# Patient Record
Sex: FEMALE | Race: WHITE | URBAN - METROPOLITAN AREA
[De-identification: names, ages, dates, MRNs, and addresses within clinical notes are randomized per-mention and may not be internally consistent; named-entity substitution may affect disease eponyms.]

---

## 2020-11-13 ENCOUNTER — OFFICE VISIT (OUTPATIENT)
Dept: URBAN - METROPOLITAN AREA CLINIC 13 | Facility: CLINIC | Age: 55
End: 2020-11-13
Payer: COMMERCIAL

## 2020-11-13 DIAGNOSIS — H43.13 VITREOUS HEMORRHAGE, BILATERAL: ICD-10-CM

## 2020-11-13 DIAGNOSIS — Z96.1 PRESENCE OF PSEUDOPHAKIA: ICD-10-CM

## 2020-11-13 DIAGNOSIS — E11.3513 TYPE 2 DIABETES MELLITUS WITH PROLIFERATIVE DIABETIC RETINOPATHY WITH MACULAR EDEMA, BILATERAL: Primary | ICD-10-CM

## 2020-11-13 PROCEDURE — 99213 OFFICE O/P EST LOW 20 MIN: CPT | Performed by: OPHTHALMOLOGY

## 2020-11-13 PROCEDURE — 92134 CPTRZ OPH DX IMG PST SGM RTA: CPT | Performed by: OPHTHALMOLOGY

## 2020-11-13 ASSESSMENT — INTRAOCULAR PRESSURE
OD: 18
OS: 18

## 2020-11-13 NOTE — IMPRESSION/PLAN
Impression: Vitreous hemorrhage, bilateral: H43.13 OU.
s/p PPV/PRP OS 7/12/17 Plan: S/P Ozurdex OU.   Monitor

## 2020-11-13 NOTE — IMPRESSION/PLAN
Impression: Type 2 diab with prolif diab rtnop with macular edema, bi: [id number]. OU.
s/p Ozurdex OU last 08/16/19
s/p Avastin OU last 11/26/19 S/P PPV/PRP OS last 7/12/17
s/p PRP OD 7/11/17 Plan: Exam and OCT confirm resolved VH OU,  PDR with improving DME OU, s/p Ozurdex OU 9/4/2020. IOP stable. Discussed findings and options. Recommend observation OU today. BS/BP control. 

RTC 6-8 weeks OCT OU reeval Ozurdex

## 2021-01-19 ENCOUNTER — OFFICE VISIT (OUTPATIENT)
Dept: URBAN - METROPOLITAN AREA CLINIC 13 | Facility: CLINIC | Age: 56
End: 2021-01-19
Payer: COMMERCIAL

## 2021-01-19 PROCEDURE — 99213 OFFICE O/P EST LOW 20 MIN: CPT | Performed by: OPHTHALMOLOGY

## 2021-01-19 PROCEDURE — 92134 CPTRZ OPH DX IMG PST SGM RTA: CPT | Performed by: OPHTHALMOLOGY

## 2021-01-19 ASSESSMENT — INTRAOCULAR PRESSURE
OS: 14
OD: 14

## 2021-01-19 NOTE — IMPRESSION/PLAN
Impression: Type 2 diab with prolif diab rtnop with macular edema, bi: [id number]. OU.
s/p Ozurdex OU last 08/16/19
s/p Avastin OU last 11/26/19 S/P PPV/PRP OS last 7/12/17
s/p PRP OD 7/11/17 Plan: Exam and OCT confirm resolved VH OU,  PDR with minimal DME OU, s/p Ozurdex OU 9/4/2020. IOP stable. Discussed findings and options. Recommend observation OU today. BS/BP control. 

RTC 6 months OCT OU reeval Ozurdex

## 2021-07-20 ENCOUNTER — OFFICE VISIT (OUTPATIENT)
Dept: URBAN - METROPOLITAN AREA CLINIC 13 | Facility: CLINIC | Age: 56
End: 2021-07-20
Payer: COMMERCIAL

## 2021-07-20 PROCEDURE — 92014 COMPRE OPH EXAM EST PT 1/>: CPT | Performed by: OPHTHALMOLOGY

## 2021-07-20 PROCEDURE — 92134 CPTRZ OPH DX IMG PST SGM RTA: CPT | Performed by: OPHTHALMOLOGY

## 2021-07-20 ASSESSMENT — INTRAOCULAR PRESSURE
OS: 15
OD: 18

## 2021-07-20 NOTE — IMPRESSION/PLAN
Impression: Type 2 diab with prolif diab rtnop with macular edema, bi: [id number]. OU.
s/p Ozurdex OU last 09/4/2020
s/p Avastin OU last 11/26/19 S/P PPV/PRP OS last 7/12/17
s/p PRP OD 7/11/17 Plan: Exam and OCT confirm resolved VH OU,  PDR with worsening, diffuse DME OD>OS, s/p Ozurdex OU 9/4/2020. IOP stable. Discussed findings and options. Recommend Ozurdex OU. Pt elects to proceed on Friday RTC 3 days for Ozurdex OU (straight)

## 2021-07-20 NOTE — IMPRESSION/PLAN
Impression: Presence of pseudophakia: Z96.1. Bilateral. Plan: Doing well s/p CE/IOL OU. Mild PCO OD>OS.   Follow

## 2021-07-23 ENCOUNTER — PROCEDURE (OUTPATIENT)
Dept: URBAN - METROPOLITAN AREA CLINIC 13 | Facility: CLINIC | Age: 56
End: 2021-07-23
Payer: COMMERCIAL

## 2021-07-23 ASSESSMENT — INTRAOCULAR PRESSURE
OD: 12
OS: 14

## 2021-08-31 ENCOUNTER — OFFICE VISIT (OUTPATIENT)
Dept: URBAN - METROPOLITAN AREA CLINIC 13 | Facility: CLINIC | Age: 56
End: 2021-08-31
Payer: COMMERCIAL

## 2021-08-31 PROCEDURE — 99213 OFFICE O/P EST LOW 20 MIN: CPT | Performed by: OPHTHALMOLOGY

## 2021-08-31 PROCEDURE — 92134 CPTRZ OPH DX IMG PST SGM RTA: CPT | Performed by: OPHTHALMOLOGY

## 2021-08-31 ASSESSMENT — INTRAOCULAR PRESSURE
OS: 16
OD: 14

## 2021-08-31 NOTE — IMPRESSION/PLAN
Impression: Type 2 diab with prolif diab rtnop with macular edema, bi: [id number]. OU.
s/p Ozurdex OU last 7/23/21
s/p Avastin OU last 11/26/19 S/P PPV/PRP OS last 7/12/17
s/p PRP OD 7/11/17 Plan: Exam and OCT confirm resolved VH OU,  PDR with improving DME OU, s/p Ozurdex OU 7/23/21. IOP stable. Discussed findings and options. Recommend observation OU today. Pt understands to call with s/s dec [facility]/pain/floaters prior to next scheduled visit. 

RTC 4 months for OCT OU re-eval for Ozurdex

## 2021-12-03 ENCOUNTER — OFFICE VISIT (OUTPATIENT)
Dept: URBAN - METROPOLITAN AREA CLINIC 7 | Facility: CLINIC | Age: 56
End: 2021-12-03
Payer: COMMERCIAL

## 2021-12-03 PROCEDURE — 92134 CPTRZ OPH DX IMG PST SGM RTA: CPT | Performed by: OPHTHALMOLOGY

## 2021-12-03 PROCEDURE — 99214 OFFICE O/P EST MOD 30 MIN: CPT | Performed by: OPHTHALMOLOGY

## 2021-12-03 ASSESSMENT — INTRAOCULAR PRESSURE
OS: 14
OD: 13

## 2021-12-03 NOTE — IMPRESSION/PLAN
Impression: Type 2 diab with prolif diab rtnop with macular edema, bi: [id number]. OU.
s/p Ozurdex OU last 7/23/21
s/p Avastin OU last 11/26/19 S/P PPV/PRP OS last 7/12/17
s/p PRP OD 7/11/17 Plan: Active PDR with VH OD. OCT shows recurrent DME OS, no view OD, s/p Ozurdex OU 7/23/21. IOP stable. Discussed findings and options. Recommend surgery OD in 1-2 weeks. Recommend Ozurdex OS today - pt elects to proceed. Pt understands to call with s/s dec [facility]/pain/floaters prior to next scheduled visit.

## 2021-12-03 NOTE — IMPRESSION/PLAN
Impression: Vitreous hemorrhage, bilateral: H43.13 OU.
s/p PPV/PRP OS 7/12/17 Plan: --dense, recurrent VH OD despite adequate time for resolution
--resolved VH OS s/p PPV/PRP OS
--findings/diagnosis d/w patient in detail
--options discussed, inc observation, anti-VEGF, and PPV
--r/b/a PPV/EL discussed, inc bleeding, infection, pain, loss of vision --pt elects to proceed with surgery at this time --pt elects pre-op Lucentis 0.3 (sample) today to reduce risk of intra-op hemorrhage SURGICAL PLAN: 25G PPV/MP/EL OD

## 2021-12-15 ENCOUNTER — SURGERY (OUTPATIENT)
Dept: URBAN - METROPOLITAN AREA EXTERNAL CLINIC 26 | Facility: EXTERNAL CLINIC | Age: 56
End: 2021-12-15
Payer: COMMERCIAL

## 2021-12-15 PROCEDURE — 67040 LASER TREATMENT OF RETINA: CPT | Performed by: OPHTHALMOLOGY

## 2021-12-16 ENCOUNTER — POST-OPERATIVE VISIT (OUTPATIENT)
Dept: URBAN - METROPOLITAN AREA CLINIC 27 | Facility: CLINIC | Age: 56
End: 2021-12-16

## 2021-12-16 PROCEDURE — 99024 POSTOP FOLLOW-UP VISIT: CPT | Performed by: OPHTHALMOLOGY

## 2021-12-16 ASSESSMENT — INTRAOCULAR PRESSURE
OS: 10
OD: 10

## 2021-12-16 NOTE — IMPRESSION/PLAN
Impression: S/P 25G PPV/MP/EL OD - 1 Day. Vitreous hemorrhage, bilateral  H43.13.  Plan: --retina attached
--no s/s infection
--IOP acceptable
--start PF/Oflox QID
--RD/endoph WS discussed
--f/u 1 week

## 2022-02-01 ENCOUNTER — POST-OPERATIVE VISIT (OUTPATIENT)
Dept: URBAN - METROPOLITAN AREA CLINIC 13 | Facility: CLINIC | Age: 57
End: 2022-02-01
Payer: COMMERCIAL

## 2022-02-01 PROCEDURE — 99024 POSTOP FOLLOW-UP VISIT: CPT | Performed by: OPHTHALMOLOGY

## 2022-02-01 PROCEDURE — 92134 CPTRZ OPH DX IMG PST SGM RTA: CPT | Performed by: OPHTHALMOLOGY

## 2022-02-01 ASSESSMENT — INTRAOCULAR PRESSURE
OS: 16
OD: 14

## 2022-02-01 NOTE — IMPRESSION/PLAN
Impression: s/p 25g PPV/MP/EL OD - 42 Days. Vitreous hemorrhage, bilateral  H43.13.
s/p Ozurdex, last 12/21/21 Plan: --retina attached
--no s/s infection
--IOP acceptable
--OCT: resolved DME s/p OZ
--rec observation today
--RD/endoph WS discussed RTC 6 wks DFE/OCT OU re-eval Ozurdex

## 2022-03-29 ENCOUNTER — OFFICE VISIT (OUTPATIENT)
Dept: URBAN - METROPOLITAN AREA CLINIC 13 | Facility: CLINIC | Age: 57
End: 2022-03-29
Payer: COMMERCIAL

## 2022-03-29 PROCEDURE — 92134 CPTRZ OPH DX IMG PST SGM RTA: CPT | Performed by: OPHTHALMOLOGY

## 2022-03-29 PROCEDURE — 99213 OFFICE O/P EST LOW 20 MIN: CPT | Performed by: OPHTHALMOLOGY

## 2022-03-29 ASSESSMENT — INTRAOCULAR PRESSURE
OS: 16
OD: 17

## 2022-03-29 NOTE — IMPRESSION/PLAN
Impression: Type 2 diab with prolif diab rtnop with macular edema, bi: [id number]. OU.
s/p Ozurdex OU, last OD 12/21/21, last OS 12/3/21 
s/p Avastin OU last 11/26/19 S/P PPV/PRP OS last 7/12/17
s/p PRP OD 7/11/17 Plan: Stable PDR with resolved VH OD. OCT shows mild DME OU. IOP stable. Discussed findings and options. Recommend observation. Pt understands to call with s/s dec [facility]/pain/floaters prior to next scheduled visit. 

RTC 3 months for DFE/OCT OU re-eval for Ozurdex

## 2022-03-29 NOTE — IMPRESSION/PLAN
Impression: Vitreous hemorrhage, bilateral  H43.13.
s/p 25g PPV/MP/EL OD 12/15/21
s/p 25g PPV/MP/EL OS 2017 Plan: VH remains resolved OU on exam.  Observe.   BS/BP control discussed

## 2022-06-07 ENCOUNTER — OFFICE VISIT (OUTPATIENT)
Dept: URBAN - METROPOLITAN AREA CLINIC 13 | Facility: CLINIC | Age: 57
End: 2022-06-07
Payer: COMMERCIAL

## 2022-06-07 DIAGNOSIS — E11.3513 TYPE 2 DIABETES MELLITUS WITH PROLIFERATIVE DIABETIC RETINOPATHY WITH MACULAR EDEMA, BILATERAL: Primary | ICD-10-CM

## 2022-06-07 DIAGNOSIS — Z96.1 PRESENCE OF PSEUDOPHAKIA: ICD-10-CM

## 2022-06-07 DIAGNOSIS — H43.13 VITREOUS HEMORRHAGE, BILATERAL: ICD-10-CM

## 2022-06-07 PROCEDURE — 99214 OFFICE O/P EST MOD 30 MIN: CPT | Performed by: OPHTHALMOLOGY

## 2022-06-07 PROCEDURE — 92134 CPTRZ OPH DX IMG PST SGM RTA: CPT | Performed by: OPHTHALMOLOGY

## 2022-06-07 ASSESSMENT — INTRAOCULAR PRESSURE
OD: 18
OS: 15

## 2022-06-07 NOTE — IMPRESSION/PLAN
Impression: Type 2 diab with prolif diab rtnop with macular edema, bi: [id number]. OU.
s/p Ozurdex OU, last OD 12/21/21, last OS 12/3/21 
s/p Avastin OU last 11/26/19 S/P PPV/PRP OS last 7/12/17
s/p PRP OD 7/11/17 Plan: Stable PDR with resolved VH OD. OCT shows worsening DME OU. IOP stable. Discussed findings and options. Recommend Ozurdex OU. Pt elects to proceed with Ozurdex OU today. Pt understands to call with s/s dec VA/pain/floaters prior to next scheduled visit. 

RTC 8 weeks for DFE/OCT OU

## 2022-08-02 ENCOUNTER — OFFICE VISIT (OUTPATIENT)
Dept: URBAN - METROPOLITAN AREA CLINIC 13 | Facility: CLINIC | Age: 57
End: 2022-08-02
Payer: COMMERCIAL

## 2022-08-02 DIAGNOSIS — H43.13 VITREOUS HEMORRHAGE, BILATERAL: ICD-10-CM

## 2022-08-02 DIAGNOSIS — E11.3513 TYPE 2 DIABETES MELLITUS WITH PROLIFERATIVE DIABETIC RETINOPATHY WITH MACULAR EDEMA, BILATERAL: Primary | ICD-10-CM

## 2022-08-02 DIAGNOSIS — Z96.1 PRESENCE OF PSEUDOPHAKIA: ICD-10-CM

## 2022-08-02 PROCEDURE — 99213 OFFICE O/P EST LOW 20 MIN: CPT | Performed by: OPHTHALMOLOGY

## 2022-08-02 PROCEDURE — 92134 CPTRZ OPH DX IMG PST SGM RTA: CPT | Performed by: OPHTHALMOLOGY

## 2022-08-02 ASSESSMENT — INTRAOCULAR PRESSURE
OS: 12
OD: 12

## 2022-08-02 NOTE — IMPRESSION/PLAN
Impression: Type 2 diab with prolif diab rtnop with macular edema, bi: [id number]. OU.
s/p Ozurdex OU, last OD 06/07/22, last OS 06/07/22 
s/p Avastin OU last 11/26/19 S/P PPV/PRP OS last 7/12/17
s/p PRP OD 7/11/17 Plan: Stable PDR with resolved VH OU. OCT shows improving DME OU s/p Ozurdex   IOP stable. Discussed findings and options. Recommend observation OU. Pt understands to call with s/s dec [facility]/pain/floaters prior to next scheduled visit. Will consider Iluvien if DME recurs in future given multiple exacerbations with Ozurdex. 

RTC 4 months for DFE/OCT OU re-eval for Ozurdex vs Iluvien

## 2022-12-06 ENCOUNTER — OFFICE VISIT (OUTPATIENT)
Dept: URBAN - METROPOLITAN AREA CLINIC 13 | Facility: CLINIC | Age: 57
End: 2022-12-06
Payer: COMMERCIAL

## 2022-12-06 DIAGNOSIS — H43.13 VITREOUS HEMORRHAGE, BILATERAL: ICD-10-CM

## 2022-12-06 DIAGNOSIS — Z96.1 PRESENCE OF PSEUDOPHAKIA: ICD-10-CM

## 2022-12-06 DIAGNOSIS — E11.3513 TYPE 2 DIABETES MELLITUS WITH PROLIFERATIVE DIABETIC RETINOPATHY WITH MACULAR EDEMA, BILATERAL: Primary | ICD-10-CM

## 2022-12-06 PROCEDURE — 92134 CPTRZ OPH DX IMG PST SGM RTA: CPT | Performed by: OPHTHALMOLOGY

## 2022-12-06 PROCEDURE — 67028 INJECTION EYE DRUG: CPT | Performed by: OPHTHALMOLOGY

## 2022-12-06 PROCEDURE — 99213 OFFICE O/P EST LOW 20 MIN: CPT | Performed by: OPHTHALMOLOGY

## 2022-12-06 ASSESSMENT — INTRAOCULAR PRESSURE
OS: 16
OD: 17

## 2022-12-06 NOTE — IMPRESSION/PLAN
Impression: Type 2 diab with prolif diab rtnop with macular edema, bi: [id number]. OU.
s/p Ozurdex OU, last OD 06/07/22, last OS 06/07/22 
s/p Avastin OU last 11/26/19 S/P PPV/PRP OS last 7/12/17
s/p PRP OD 7/11/17 Plan: Stable PDR with resolved VH OU. OCT shows worsening DME OD>OS. IOP stable. Discussed findings and options. Recommend Iluvien OD. Discussed RBA of Iluvien x PDR/DME. Pt elects to proceed with Iluvien today - no complications encountered. Pt understands to call with s/s dec [facility]/pain/floaters prior to next scheduled visit.   

RTC 3 months for DFE/OCT OU re-eval for [facility]

## 2023-07-21 ENCOUNTER — OFFICE VISIT (OUTPATIENT)
Dept: URBAN - METROPOLITAN AREA CLINIC 13 | Facility: CLINIC | Age: 58
End: 2023-07-21
Payer: COMMERCIAL

## 2023-07-21 DIAGNOSIS — E11.3513 TYPE 2 DIABETES MELLITUS WITH PROLIFERATIVE DIABETIC RETINOPATHY WITH MACULAR EDEMA, BILATERAL: Primary | ICD-10-CM

## 2023-07-21 DIAGNOSIS — Z96.1 PRESENCE OF PSEUDOPHAKIA: ICD-10-CM

## 2023-07-21 DIAGNOSIS — H43.13 VITREOUS HEMORRHAGE, BILATERAL: ICD-10-CM

## 2023-07-21 PROCEDURE — 92134 CPTRZ OPH DX IMG PST SGM RTA: CPT | Performed by: OPHTHALMOLOGY

## 2023-07-21 PROCEDURE — 99213 OFFICE O/P EST LOW 20 MIN: CPT | Performed by: OPHTHALMOLOGY

## 2023-07-21 ASSESSMENT — INTRAOCULAR PRESSURE
OD: 14
OS: 16

## 2023-07-21 NOTE — IMPRESSION/PLAN
Impression: Type 2 diab with prolif diab rtnop with macular edema, bi: [id number]. OU.
s/p Ozurdex OU, last OD 06/07/22, last OS 06/07/22 
s/p Avastin OU last 11/26/19 S/P PPV/PRP OS last 7/12/17
s/p PRP OD 7/11/17
s/p Iluvien OD x 1, last 12/06/22, OS x 1 03/14/23 Plan: Stable PDR with resolved VH OU. Exam and OCT show minimal edema OU. IOP stable OU. Discussed findings and options. Recommend observation. Pt understands to call with s/s dec VA/pain/floaters prior to next scheduled visit.   

RTC 6 months for OCT OU re-eval for Ozurdex

## 2024-01-05 ENCOUNTER — OFFICE VISIT (OUTPATIENT)
Dept: URBAN - METROPOLITAN AREA CLINIC 13 | Facility: CLINIC | Age: 59
End: 2024-01-05
Payer: COMMERCIAL

## 2024-01-05 DIAGNOSIS — Z96.1 PRESENCE OF PSEUDOPHAKIA: ICD-10-CM

## 2024-01-05 DIAGNOSIS — E11.3513 TYPE 2 DIABETES MELLITUS WITH PROLIFERATIVE DIABETIC RETINOPATHY WITH MACULAR EDEMA, BILATERAL: Primary | ICD-10-CM

## 2024-01-05 DIAGNOSIS — H43.13 VITREOUS HEMORRHAGE, BILATERAL: ICD-10-CM

## 2024-01-05 PROCEDURE — 92014 COMPRE OPH EXAM EST PT 1/>: CPT | Performed by: OPHTHALMOLOGY

## 2024-01-05 PROCEDURE — 92134 CPTRZ OPH DX IMG PST SGM RTA: CPT | Performed by: OPHTHALMOLOGY

## 2024-01-05 ASSESSMENT — INTRAOCULAR PRESSURE
OD: 18
OS: 18

## 2024-06-10 ENCOUNTER — OFFICE VISIT (OUTPATIENT)
Dept: URBAN - METROPOLITAN AREA CLINIC 7 | Facility: CLINIC | Age: 59
End: 2024-06-10
Payer: COMMERCIAL

## 2024-06-10 DIAGNOSIS — Z96.1 PRESENCE OF PSEUDOPHAKIA: ICD-10-CM

## 2024-06-10 DIAGNOSIS — E11.3513 TYPE 2 DIABETES MELLITUS WITH PROLIFERATIVE DIABETIC RETINOPATHY WITH MACULAR EDEMA, BILATERAL: Primary | ICD-10-CM

## 2024-06-10 DIAGNOSIS — H43.13 VITREOUS HEMORRHAGE, BILATERAL: ICD-10-CM

## 2024-06-10 PROCEDURE — 92014 COMPRE OPH EXAM EST PT 1/>: CPT | Performed by: OPHTHALMOLOGY

## 2024-06-10 PROCEDURE — 92134 CPTRZ OPH DX IMG PST SGM RTA: CPT | Performed by: OPHTHALMOLOGY

## 2024-06-10 ASSESSMENT — INTRAOCULAR PRESSURE
OD: 18
OS: 17

## 2024-12-06 ENCOUNTER — OFFICE VISIT (OUTPATIENT)
Dept: URBAN - METROPOLITAN AREA CLINIC 13 | Facility: CLINIC | Age: 59
End: 2024-12-06
Payer: COMMERCIAL

## 2024-12-06 DIAGNOSIS — Z96.1 PRESENCE OF PSEUDOPHAKIA: ICD-10-CM

## 2024-12-06 DIAGNOSIS — H43.13 VITREOUS HEMORRHAGE, BILATERAL: ICD-10-CM

## 2024-12-06 DIAGNOSIS — E11.3513 TYPE 2 DIABETES MELLITUS WITH PROLIFERATIVE DIABETIC RETINOPATHY WITH MACULAR EDEMA, BILATERAL: Primary | ICD-10-CM

## 2024-12-06 PROCEDURE — 92134 CPTRZ OPH DX IMG PST SGM RTA: CPT | Performed by: OPHTHALMOLOGY

## 2024-12-06 PROCEDURE — 92014 COMPRE OPH EXAM EST PT 1/>: CPT | Performed by: OPHTHALMOLOGY

## 2024-12-06 ASSESSMENT — INTRAOCULAR PRESSURE
OD: 13
OS: 13

## 2025-06-06 ENCOUNTER — OFFICE VISIT (OUTPATIENT)
Dept: URBAN - METROPOLITAN AREA CLINIC 13 | Facility: CLINIC | Age: 60
End: 2025-06-06
Payer: COMMERCIAL

## 2025-06-06 DIAGNOSIS — Z96.1 PRESENCE OF PSEUDOPHAKIA: ICD-10-CM

## 2025-06-06 DIAGNOSIS — H43.13 VITREOUS HEMORRHAGE, BILATERAL: ICD-10-CM

## 2025-06-06 DIAGNOSIS — E11.3513 TYPE 2 DIABETES MELLITUS WITH PROLIFERATIVE DIABETIC RETINOPATHY WITH MACULAR EDEMA, BILATERAL: Primary | ICD-10-CM

## 2025-06-06 PROCEDURE — 92014 COMPRE OPH EXAM EST PT 1/>: CPT | Performed by: OPHTHALMOLOGY

## 2025-06-06 PROCEDURE — 92134 CPTRZ OPH DX IMG PST SGM RTA: CPT | Performed by: OPHTHALMOLOGY

## 2025-06-06 ASSESSMENT — INTRAOCULAR PRESSURE
OD: 17
OS: 17